# Patient Record
Sex: MALE | Race: WHITE | URBAN - METROPOLITAN AREA
[De-identification: names, ages, dates, MRNs, and addresses within clinical notes are randomized per-mention and may not be internally consistent; named-entity substitution may affect disease eponyms.]

---

## 2019-05-31 ENCOUNTER — OFFICE VISIT (OUTPATIENT)
Dept: FAMILY MEDICINE | Facility: CLINIC | Age: 34
End: 2019-05-31
Payer: COMMERCIAL

## 2019-05-31 VITALS
BODY MASS INDEX: 25.33 KG/M2 | HEIGHT: 65 IN | TEMPERATURE: 98.8 F | DIASTOLIC BLOOD PRESSURE: 76 MMHG | HEART RATE: 65 BPM | SYSTOLIC BLOOD PRESSURE: 121 MMHG | WEIGHT: 152 LBS | OXYGEN SATURATION: 98 %

## 2019-05-31 DIAGNOSIS — J02.9 SORE THROAT: Primary | ICD-10-CM

## 2019-05-31 LAB
DEPRECATED S PYO AG THROAT QL EIA: NORMAL
SPECIMEN SOURCE: NORMAL

## 2019-05-31 PROCEDURE — 87880 STREP A ASSAY W/OPTIC: CPT | Performed by: PHYSICIAN ASSISTANT

## 2019-05-31 PROCEDURE — 87081 CULTURE SCREEN ONLY: CPT | Performed by: PHYSICIAN ASSISTANT

## 2019-05-31 PROCEDURE — 99213 OFFICE O/P EST LOW 20 MIN: CPT | Performed by: PHYSICIAN ASSISTANT

## 2019-05-31 ASSESSMENT — MIFFLIN-ST. JEOR: SCORE: 1558.41

## 2019-05-31 NOTE — PROGRESS NOTES
"Subjective     Angelito Carpio is a 33 year old male who presents to clinic today for the following health issues:    HPI   ENT Symptoms             Symptoms: cc Present Absent Comment   Fever/Chills   x    Fatigue  x     Muscle Aches  x     Eye Irritation   x    Sneezing   x    Nasal Kai/Drg   x    Sinus Pressure/Pain   x    Loss of smell   x    Dental pain   x    Sore Throat  x     Swollen Glands   x    Ear Pain/Fullness   x    Cough  x  Has phlegm    Wheeze  x     Chest Pain   x    Shortness of breath  x  Feels like throat is tight   Rash   x    Other         Symptom duration:  3 days    Symptom severity:  moderate   Treatments tried:  ibuprofen    Contacts:  none    Had a rapid strep yesterday that was negative but work up worse this am so came to clinic.          There is no problem list on file for this patient.    History reviewed. No pertinent surgical history.    Social History     Tobacco Use     Smoking status: Current Some Day Smoker     Smokeless tobacco: Never Used   Substance Use Topics     Alcohol use: Yes     History reviewed. No pertinent family history.          Reviewed and updated as needed this visit by Provider  Allergies  Meds         Review of Systems   ROS COMP: Constitutional, HEENT, cardiovascular, pulmonary, gi and skin systems are negative, except as otherwise noted.      Objective    /76   Pulse 65   Temp 98.8  F (37.1  C) (Oral)   Ht 1.638 m (5' 4.5\")   Wt 68.9 kg (152 lb)   SpO2 98%   BMI 25.69 kg/m    Body mass index is 25.69 kg/m .  Physical Exam   Constitutional: He appears well-developed and well-nourished.   HENT:   Right Ear: External ear normal.   Left Ear: External ear normal.   Mouth/Throat: Oropharyngeal exudate (on uvula ) present.   Throat red, tonsils not enlarged    Cardiovascular: Normal rate, regular rhythm and normal heart sounds.   Pulmonary/Chest: Effort normal and breath sounds normal.   Lymphadenopathy:     He has no cervical adenopathy.    " "      Diagnostic Test Results:  Strep screen - Negative        Assessment & Plan     1. Sore throat  Viral.    - Rapid strep screen  - Beta strep group A culture     Tobacco Cessation:   reports that he has been smoking.  He has never used smokeless tobacco.        BMI:   Estimated body mass index is 25.69 kg/m  as calculated from the following:    Height as of this encounter: 1.638 m (5' 4.5\").    Weight as of this encounter: 68.9 kg (152 lb).               Return in about 1 week (around 6/7/2019) for if not improving.    Jennifer Herr PA-C  [facility]      "

## 2019-05-31 NOTE — LETTER
May 31, 2019      Angelito Franciscoamado  [address] [place] [address]        To Whom It May Concern,      Please excuse Angelito from work today.          Sincerely,        Jennifer eHrr PA-C

## 2019-06-01 LAB
BACTERIA SPEC CULT: NORMAL
SPECIMEN SOURCE: NORMAL